# Patient Record
Sex: FEMALE | Employment: FULL TIME | URBAN - METROPOLITAN AREA
[De-identification: names, ages, dates, MRNs, and addresses within clinical notes are randomized per-mention and may not be internally consistent; named-entity substitution may affect disease eponyms.]

---

## 2025-02-05 ENCOUNTER — APPOINTMENT (OUTPATIENT)
Dept: DERMATOLOGY | Facility: CLINIC | Age: 35
End: 2025-02-05
Payer: COMMERCIAL

## 2025-02-05 DIAGNOSIS — Z79.899 HIGH RISK MEDICATION USE: ICD-10-CM

## 2025-02-05 DIAGNOSIS — D48.5 NEOPLASM OF UNCERTAIN BEHAVIOR OF SKIN: ICD-10-CM

## 2025-02-05 DIAGNOSIS — L30.9 DERMATITIS, UNSPECIFIED: Primary | ICD-10-CM

## 2025-02-05 PROCEDURE — 11900 INJECT SKIN LESIONS </W 7: CPT | Performed by: DERMATOLOGY

## 2025-02-05 PROCEDURE — 11104 PUNCH BX SKIN SINGLE LESION: CPT | Performed by: DERMATOLOGY

## 2025-02-05 PROCEDURE — 99204 OFFICE O/P NEW MOD 45 MIN: CPT | Performed by: DERMATOLOGY

## 2025-02-05 RX ORDER — TRIAMCINOLONE ACETONIDE 40 MG/ML
60 INJECTION, SUSPENSION INTRA-ARTICULAR; INTRAMUSCULAR ONCE
Status: COMPLETED | OUTPATIENT
Start: 2025-02-05 | End: 2025-02-05

## 2025-02-05 RX ORDER — MAG HYDROX/ALUMINUM HYD/SIMETH 200-200-20
SUSPENSION, ORAL (FINAL DOSE FORM) ORAL
COMMUNITY

## 2025-02-05 RX ORDER — HYDROCHLOROTHIAZIDE 12.5 MG/1
12.5 TABLET ORAL DAILY
COMMUNITY

## 2025-02-05 RX ORDER — AMLODIPINE BESYLATE 5 MG/1
5 TABLET ORAL
COMMUNITY
Start: 2024-11-22

## 2025-02-05 RX ORDER — TRIAMCINOLONE ACETONIDE 1 MG/G
CREAM TOPICAL
Qty: 454 G | Refills: 1 | Status: SHIPPED | OUTPATIENT
Start: 2025-02-05

## 2025-02-05 RX ADMIN — TRIAMCINOLONE ACETONIDE 60 MG: 40 INJECTION, SUSPENSION INTRA-ARTICULAR; INTRAMUSCULAR at 15:31

## 2025-02-05 NOTE — PROGRESS NOTES
Subjective     Matt Gaona is a 34 y.o. female who presents for the following: Rash (Generalized- since October. Pt states itchiness to sites. Pt states treatment with hydrocortisone cream, unknown prescribed pill, and OTC lotion with no response. Pt denies history of skin cancers. Pt declines chaperone. ).     Review of Systems:  No other skin or systemic complaints other than what is documented elsewhere in the note.    The following portions of the chart were reviewed this encounter and updated as appropriate:   Allergies  Meds  Problems  Med Hx  Surg Hx  Fam Hx                Objective   Well appearing patient in no apparent distress; mood and affect are within normal limits.    A focused skin examination was performed. All findings within normal limits unless otherwise noted below.    Assessment/Plan   1. Dermatitis, unspecified  Well demarcated erythematous, violaceous hyperpigmented plaques on majority of the body surface; only the face is spared                                                                          BSA >50% on exam today  -Discussed differential diagnosis; connective tissue disease v CTCL v PsO v atopic dermatitis  -Punch biopsy today  -Start triamcinolone cream twice daily  -IMK today to offer patient some relief hopefully while awaiting pathology  -Patient states no new medications before onset at the end of October. The rash first began on the arms, and has rapidly spread to involve the entire body except for the face. No one else at home with rash.   -Patient states has been on hydrochlorothiazide for years  -Re check in 2 weeks time      -Potential side effects of steroids reviewed, including but not limited to: elevation in blood pressure, elevation in blood sugar/worsening of diabetes, risk of cataracts/glaucoma, weight gain, bone loss, joint disease/aseptic necrosis, insomnia. There is a risk of atrophy at injection site.  -Discussed the need for GI prophylaxis if  taking a longer course of systemic corticosteroids.  After discussion, patient wishes to proceed with intramuscular Kenalog injection today   -Patient verbalizes understanding of these potential risks and verbal consent was obtained from the patient to treat with intramuscular Kenalog.  -Intramuscular kenalog  40mg/ml x 1.5 ml was injected into the R buttock after prepping the skin with alcohol. The patient tolerated the procedure well with no complications.   Medication Lot No. [id number]  Expiration 4/2026      triamcinolone (Kenalog) 0.1 % cream  Apply to affected areas of rash twice daily    Related Medications  triamcinolone acetonide (Kenalog-40) injection 60 mg      2. Neoplasm of uncertain behavior of skin  Left Lower Back  Erythematous, violaceous hyperpigmented plaques on majority of the body surface              Lesion biopsy  Type of biopsy: punch    Informed consent: discussed and consent obtained    Timeout: patient name, date of birth, surgical site, and procedure verified    Procedure prep:  Patient was prepped and draped  Anesthesia: the lesion was anesthetized in a standard fashion    Anesthetic:  1% lidocaine w/ epinephrine 1-100,000 local infiltration  Punch size:  4 mm  Suture size:  4-0  Suture type: Prolene (polypropylene)    Suture removal (days):  14  Hemostasis achieved with: suture    Outcome: patient tolerated procedure well    Post-procedure details: sterile dressing applied and wound care instructions given    Dressing type: bandage and petrolatum      Staff Communication: Dermatology Local Anesthesia: Site Location: L lower back 1 % Lidocaine / Epinephrine - Amount: 1cc    Specimen 1 - Dermatopathology- DERM LAB  Differential Diagnosis: r/o CTCL v cutaneous lupus v atopic dermatitis  Check Margins Yes/No?:    Comments:    Dermpath Lab: Routine Histopathology (formalin-fixed tissue)        Follow up in 2 weeks time with Dr. Iqbal for dermatitis    Discussed if there are any changes or  development of concerning symptoms (lesion/skin condition is changing, bleeding, enlarging, or worsening) the patient is to contact my office. The patient verbalizes understanding.    Theodora Jurado MD  2/5/2025      Addendum: Patients biopsy returned as:  SKIN, LEFT LOWER BACK, PUNCH BIOPSY:  SUBACUTE SPONGIOTIC DERMATITIS, SEE NOTE.  The differential diagnosis includes atopic dermatitis, nummular dermatitis, allergic contact dermatitis, id reaction, and eczematous drug reaction.   Recommend for the patient to get labs drawn in anticipation for starting dupixent given BSA affected. Labs ordered. Nurse to notify patient. Patient scheduled for follow up with me in 2 weeks time.    Theodora Jurado MD  02/08/25

## 2025-02-07 LAB
LABORATORY COMMENT REPORT: NORMAL
PATH REPORT.FINAL DX SPEC: NORMAL
PATH REPORT.GROSS SPEC: NORMAL
PATH REPORT.RELEVANT HX SPEC: NORMAL
PATH REPORT.TOTAL CANCER: NORMAL

## 2025-02-18 ENCOUNTER — APPOINTMENT (OUTPATIENT)
Dept: DERMATOLOGY | Facility: CLINIC | Age: 35
End: 2025-02-18
Payer: COMMERCIAL

## 2025-02-18 DIAGNOSIS — L98.9 DERMATOLOGIC PROBLEM: ICD-10-CM

## 2025-02-18 DIAGNOSIS — L20.89 OTHER ATOPIC DERMATITIS: Primary | ICD-10-CM

## 2025-02-18 PROCEDURE — 99214 OFFICE O/P EST MOD 30 MIN: CPT | Performed by: DERMATOLOGY

## 2025-02-18 NOTE — PROGRESS NOTES
Subjective     Matt Gaona is a 34 y.o. female who presents for the following: Dermatitis (Generalized. Follow up. Pt states treatment with triamcinolone cream with good response. Hx of treatment with IMK injection. ) and Suture / Staple Removal (Punch biopsy 02.05.25.).     Review of Systems:  No other skin or systemic complaints other than what is documented elsewhere in the note.    The following portions of the chart were reviewed this encounter and updated as appropriate:   Allergies  Meds  Problems  Med Hx  Surg Hx  Fam Hx                Objective   Well appearing patient in no apparent distress; mood and affect are within normal limits.    A focused skin examination was performed. All findings within normal limits unless otherwise noted below.    Assessment/Plan   1. Other atopic dermatitis  Well demarcated erythematous, violaceous hyperpigmented plaques on majority of the body surface; only the face is spared     Patient is improving with significant resolution of erythema and progression to PIH only on the trunk and arms; continues with active plaques on the lower legs  -Likely majority of response to IMK  -Continue triamcinolone topically to active plaques twice daily  -Reviewed biopsy results: SUBACUTE SPONGIOTIC DERMATITIS, SEE NOTE.   Note: Microscopic examination reveals a specimen that extends into the subcutaneous fat. There is parakeratosis with serum crust that is slightly mounded. The granular layer is diminished in areas and slightly thickened in other areas and there is mild irregular acanthosis of the epidermis with mild spongiosis and a mild to moderate superficial perivascular lymphocytic infiltrate with occasional eosinophils.   The differential diagnosis includes atopic dermatitis, nummular dermatitis, allergic contact dermatitis, id reaction, and eczematous drug reaction.   -Clinical exam is concerning for MF; biopsy is not consistent, will continue to monitor  -No history of  eczema as a child; rare for abrupt onset large BSA affected eczema to occur in a 33yo patient  -Patient denies any new products; does use different soaps and laundry detergents. Recommend changing to fragrance free and sensitive skin products only.  -Patient states no new medications; has been on hydrochlorothiazide and amlodipine only for years for HTN. Consider drug holiday if recalcitrant  -Discussed systemic therapy treatment options.  -Patient is open to begin NB UVB    -Recommend for the patient to start Narrow Band UVB Phototherapy.  -Treatment sessions are completed in the office 2-3 times per week   -It usually takes approximately 8 weeks or 30 sessions of phototherapy to begin to see improvement in the skin condition  -Reviewed risks of NB UVB Phototherapy, including but not limited to, darkening of the skin, potential for burning, potential for photoaging, potential for the development skin cancer.         Phototherapy treatment    2. Dermatologic problem    Related Procedures  Follow Up In Dermatology - Established Patient      -Sutures removed  -Wound dressed appropriately  -Further wound care instructions verbalized and the patient expresses understanding.        Follow up in 3 months with Dr Iqbal for dermatitis; phototherapy as scheduled  Discussed if there are any changes or development of concerning symptoms (lesion/skin condition is changing, bleeding, enlarging, or worsening) the patient is to contact my office. The patient verbalizes understanding.    Theodora Jurado MD  2/18/2025

## 2025-03-03 ENCOUNTER — APPOINTMENT (OUTPATIENT)
Dept: DERMATOLOGY | Facility: CLINIC | Age: 35
End: 2025-03-03
Payer: COMMERCIAL

## 2025-03-03 DIAGNOSIS — L20.89 OTHER ATOPIC DERMATITIS: ICD-10-CM

## 2025-03-03 PROCEDURE — 96900 ACTINOTHERAPY UV LIGHT: CPT | Performed by: DERMATOLOGY

## 2025-03-03 NOTE — PROGRESS NOTES
Phototherapy Procedure Note:    Subjective   Matt Gaona is a 34 y.o. female who presents for the following: Phototherapy    Phototherapy - Narrow Band UVB  Diagnosis - (also add to Visit Dx): Atopic Dermatitis, Other - L20.89  Phototherapy order has been reviewed?: Yes  Treatment Date: 25  Patient  Verified: Confirmed patient date of birth.  Provider: Rasheed Jurado  Location: [facility]  Treatment Number: 1  Schedule: Number of Treatment(s) per week : 2 times per week  Meter Reading in milliwatts: 3.75  UVB Dose Today mJ/Cm2 (millijoules): 400  UVB Time of Exposure Time in Minutes : Seconds: 1:47  Topical: None  Face and Eye Shielding: Goggles  Genital Shielding: Underwear  Treatment Comments: oriented to winn

## 2025-03-05 ENCOUNTER — APPOINTMENT (OUTPATIENT)
Dept: DERMATOLOGY | Facility: CLINIC | Age: 35
End: 2025-03-05
Payer: COMMERCIAL

## 2025-03-12 ENCOUNTER — APPOINTMENT (OUTPATIENT)
Dept: DERMATOLOGY | Facility: CLINIC | Age: 35
End: 2025-03-12
Payer: COMMERCIAL

## 2025-03-14 ENCOUNTER — APPOINTMENT (OUTPATIENT)
Dept: DERMATOLOGY | Facility: CLINIC | Age: 35
End: 2025-03-14
Payer: COMMERCIAL

## 2025-03-14 DIAGNOSIS — L20.89 OTHER ATOPIC DERMATITIS: ICD-10-CM

## 2025-03-14 PROCEDURE — 96900 ACTINOTHERAPY UV LIGHT: CPT | Performed by: STUDENT IN AN ORGANIZED HEALTH CARE EDUCATION/TRAINING PROGRAM

## 2025-03-14 NOTE — PROGRESS NOTES
Phototherapy Procedure Note:    Subjective   Matt Gaona is a 34 y.o. female who presents for the following: Phototherapy    Phototherapy - Narrow Band UVB  Diagnosis - (also add to Visit Dx): Atopic Dermatitis, Other - L20.89  Phototherapy order has been reviewed?: Yes  Treatment Date: 25  Patient  Verified: Confirmed patient date of birth.  Provider: Bright  Location: [facility]  Treatment Number: 2  Schedule: Number of Treatment(s) per week : 2 times per week  Meter Reading in milliwatts: 3.73  UVB Dose Today mJ/Cm2 (millijoules): 400  UVB Time of Exposure Time in Minutes : Seconds: 1:47  Topical: None  Face and Eye Shielding: Goggles  Genital Shielding: Underwear  Reaction-Redness/Erythema Grade: 0 - No erythema  NB UVB Reaction - Tenderness: No Tenderness  Treatment Comments: no c/o; held dose d/t missed tx (11 days)  NB UVB Dose Hold At mJ/Cm2: 400

## 2025-03-17 ENCOUNTER — APPOINTMENT (OUTPATIENT)
Dept: DERMATOLOGY | Facility: CLINIC | Age: 35
End: 2025-03-17
Payer: COMMERCIAL

## 2025-03-17 DIAGNOSIS — L20.89 OTHER ATOPIC DERMATITIS: ICD-10-CM

## 2025-03-17 PROCEDURE — 96900 ACTINOTHERAPY UV LIGHT: CPT | Performed by: DERMATOLOGY

## 2025-03-17 NOTE — PROGRESS NOTES
Phototherapy Procedure Note:    Michael Gaona is a 34 y.o. female who presents for the following: Phototherapy    Phototherapy - Narrow Band UVB  Diagnosis - (also add to Visit Dx): Atopic Dermatitis, Other - L20.89  Phototherapy order has been reviewed?: Yes  Treatment Date: 25  Patient  Verified: Confirmed patient date of birth.  Provider: Dr. Rasheed Jurado  [facility]: [facility]  Treatment Number: 3  Schedule: Number of Treatment(s) per week : 2 times per week  Meter Reading in milliwatts: 3.73  UVB Dose Today mJ/Cm2 (millijoules): 465  UVB Time of Exposure Time in Minutes : Seconds: 2:05  Topical: None  Face and Eye Shielding: Goggles  Genital Shielding: Underwear  Reaction-Redness/Erythema Grade: 0 - No erythema  NB UVB Reaction - Tenderness: No Tenderness  Treatment Comments: No complications after last treatment, no changes in meds, increase by 65 mj/cm2  NB UVB Dose Increased by mJ/Cm2: 65

## 2025-03-19 ENCOUNTER — APPOINTMENT (OUTPATIENT)
Dept: DERMATOLOGY | Facility: CLINIC | Age: 35
End: 2025-03-19
Payer: COMMERCIAL

## 2025-03-24 ENCOUNTER — APPOINTMENT (OUTPATIENT)
Dept: DERMATOLOGY | Facility: CLINIC | Age: 35
End: 2025-03-24
Payer: COMMERCIAL

## 2025-03-26 ENCOUNTER — APPOINTMENT (OUTPATIENT)
Dept: DERMATOLOGY | Facility: CLINIC | Age: 35
End: 2025-03-26
Payer: COMMERCIAL

## 2025-03-31 ENCOUNTER — APPOINTMENT (OUTPATIENT)
Dept: DERMATOLOGY | Facility: CLINIC | Age: 35
End: 2025-03-31
Payer: COMMERCIAL

## 2025-03-31 DIAGNOSIS — L20.89 OTHER ATOPIC DERMATITIS: ICD-10-CM

## 2025-03-31 PROCEDURE — 96900 ACTINOTHERAPY UV LIGHT: CPT | Performed by: DERMATOLOGY

## 2025-03-31 NOTE — PROGRESS NOTES
Phototherapy Procedure Note:    Michael Gaona is a 34 y.o. female who presents for the following: Phototherapy    Phototherapy - Narrow Band UVB  Diagnosis - (also add to Visit Dx): Atopic Dermatitis, Other - L20.89  Phototherapy order has been reviewed?: Yes  Treatment Date: 25  Patient  Verified: Confirmed patient date of birth.  Provider: Dr. Rasheed Jruado  [facility]: [facility]  Treatment Number: 4  Schedule: Number of Treatment(s) per week : 2 times per week  Meter Reading in milliwatts: 4.97  UVB Dose Today mJ/Cm2 (millijoules): 400  UVB Time of Exposure Time in Minutes : Seconds: 1:20  Topical: None  Face and Eye Shielding: Goggles  Genital Shielding: Underwear  Reaction-Redness/Erythema Grade: 0 - No erythema  NB UVB Reaction - Tenderness: No Tenderness  Treatment Comments: no c/o; decreased by 65mj/cm2 due to missed treatments(14 days) and winn mantenance.  NB UVB Dose Decreased by mJ/Cm2: 65

## 2025-04-02 ENCOUNTER — APPOINTMENT (OUTPATIENT)
Dept: DERMATOLOGY | Facility: CLINIC | Age: 35
End: 2025-04-02
Payer: COMMERCIAL

## 2025-04-07 ENCOUNTER — APPOINTMENT (OUTPATIENT)
Dept: DERMATOLOGY | Facility: CLINIC | Age: 35
End: 2025-04-07
Payer: COMMERCIAL

## 2025-04-09 ENCOUNTER — APPOINTMENT (OUTPATIENT)
Dept: DERMATOLOGY | Facility: CLINIC | Age: 35
End: 2025-04-09
Payer: COMMERCIAL

## 2025-04-09 DIAGNOSIS — L20.81 ATOPIC NEURODERMATITIS: ICD-10-CM

## 2025-04-09 PROCEDURE — 96900 ACTINOTHERAPY UV LIGHT: CPT | Performed by: DERMATOLOGY

## 2025-04-09 NOTE — PROGRESS NOTES
Phototherapy Procedure Note:    Michael Gaona is a 34 y.o. female who presents for the following: Phototherapy    Phototherapy - Narrow Band UVB  Diagnosis - (also add to Visit Dx): Atopic Dermatitis, Other - L20.89  Phototherapy order has been reviewed?: Yes  Treatment Date: 25  Patient  Verified: Confirmed patient date of birth.  Provider: Dr. Rasheed Jurado  [facility]  Treatment Number: 5  Schedule: Number of Treatment(s) per week : 2 times per week  Meter Reading in milliwatts: 4.84  UVB Dose Today mJ/Cm2 (millijoules): 400  UVB Time of Exposure Time in Minutes : Seconds: 1:23  Topical: None  Face and Eye Shielding: Goggles  Genital Shielding: Underwear  Reaction-Redness/Erythema Grade: 0 - No erythema  NB UVB Reaction - Tenderness: No Tenderness  Treatment Comments: Held at last treatment per protocol due to missed days, held at 440mj/cm2  NB UVB Dose Hold At mJ/Cm2: 400

## 2025-04-14 ENCOUNTER — APPOINTMENT (OUTPATIENT)
Dept: DERMATOLOGY | Facility: CLINIC | Age: 35
End: 2025-04-14
Payer: COMMERCIAL

## 2025-04-14 DIAGNOSIS — L20.89 OTHER ATOPIC DERMATITIS: ICD-10-CM

## 2025-04-14 PROCEDURE — 96900 ACTINOTHERAPY UV LIGHT: CPT | Performed by: DERMATOLOGY

## 2025-04-14 NOTE — PROGRESS NOTES
Phototherapy Procedure Note:    Michael Gaona is a 34 y.o. female who presents for the following: Phototherapy    Phototherapy - Narrow Band UVB  Diagnosis - (also add to Visit Dx): Atopic Dermatitis, Other - L20.89  Phototherapy order has been reviewed?: Yes  Treatment Date: 25  Patient  Verified: Confirmed patient date of birth.  Provider: Dr. Rasheed Jurado  [facility]: [facility]  Treatment Number: 6  Schedule: Number of Treatment(s) per week : 2 times per week  Meter Reading in milliwatts: 4.80  UVB Dose Today mJ/Cm2 (millijoules): 465  UVB Time of Exposure Time in Minutes : Seconds: 1:37  Topical: None  Face and Eye Shielding: Goggles  Genital Shielding: Underwear  Reaction-Redness/Erythema Grade: 0 - No erythema  NB UVB Reaction - Tenderness: No Tenderness  Treatment Comments: no c/o  NB UVB Dose Increased by mJ/Cm2: 65

## 2025-04-16 ENCOUNTER — APPOINTMENT (OUTPATIENT)
Dept: DERMATOLOGY | Facility: CLINIC | Age: 35
End: 2025-04-16
Payer: COMMERCIAL

## 2025-04-16 DIAGNOSIS — L20.89 OTHER ATOPIC DERMATITIS: ICD-10-CM

## 2025-04-16 PROCEDURE — 96900 ACTINOTHERAPY UV LIGHT: CPT | Performed by: DERMATOLOGY

## 2025-04-16 NOTE — PROGRESS NOTES
Phototherapy Procedure Note:    Michael Gaona is a 34 y.o. female who presents for the following: Phototherapy    Phototherapy - Narrow Band UVB  Diagnosis - (also add to Visit Dx): Atopic Dermatitis, Other - L20.89  Phototherapy order has been reviewed?: Yes  Treatment Date: 25  Patient  Verified: Confirmed patient date of birth.  Provider: Dr. Rasheed Jurado  [facility]: [facility]  Treatment Number: 7  Schedule: Number of Treatment(s) per week : 2 times per week  Meter Reading in milliwatts: 4.78  UVB Dose Today mJ/Cm2 (millijoules): 530  UVB Time of Exposure Time in Minutes : Seconds: 1:51  Topical: None  Face and Eye Shielding: Goggles  Genital Shielding: Underwear  Reaction-Redness/Erythema Grade: 0 - No erythema  NB UVB Reaction - Tenderness: No Tenderness  Treatment Comments: no c/o  NB UVB Dose Increased by mJ/Cm2: 65

## 2025-04-21 ENCOUNTER — APPOINTMENT (OUTPATIENT)
Dept: DERMATOLOGY | Facility: CLINIC | Age: 35
End: 2025-04-21
Payer: COMMERCIAL

## 2025-04-23 ENCOUNTER — APPOINTMENT (OUTPATIENT)
Dept: DERMATOLOGY | Facility: CLINIC | Age: 35
End: 2025-04-23
Payer: COMMERCIAL

## 2025-04-28 ENCOUNTER — APPOINTMENT (OUTPATIENT)
Dept: DERMATOLOGY | Facility: CLINIC | Age: 35
End: 2025-04-28
Payer: COMMERCIAL

## 2025-04-30 ENCOUNTER — APPOINTMENT (OUTPATIENT)
Dept: DERMATOLOGY | Facility: CLINIC | Age: 35
End: 2025-04-30
Payer: COMMERCIAL

## 2025-05-05 ENCOUNTER — APPOINTMENT (OUTPATIENT)
Dept: DERMATOLOGY | Facility: CLINIC | Age: 35
End: 2025-05-05
Payer: COMMERCIAL

## 2025-05-07 ENCOUNTER — APPOINTMENT (OUTPATIENT)
Dept: DERMATOLOGY | Facility: CLINIC | Age: 35
End: 2025-05-07
Payer: COMMERCIAL

## 2025-05-12 ENCOUNTER — APPOINTMENT (OUTPATIENT)
Dept: DERMATOLOGY | Facility: CLINIC | Age: 35
End: 2025-05-12
Payer: COMMERCIAL

## 2025-05-14 ENCOUNTER — APPOINTMENT (OUTPATIENT)
Dept: DERMATOLOGY | Facility: CLINIC | Age: 35
End: 2025-05-14
Payer: COMMERCIAL

## 2025-05-19 ENCOUNTER — APPOINTMENT (OUTPATIENT)
Dept: DERMATOLOGY | Facility: CLINIC | Age: 35
End: 2025-05-19
Payer: COMMERCIAL

## 2025-05-21 ENCOUNTER — APPOINTMENT (OUTPATIENT)
Dept: DERMATOLOGY | Facility: CLINIC | Age: 35
End: 2025-05-21
Payer: COMMERCIAL

## 2025-05-28 ENCOUNTER — APPOINTMENT (OUTPATIENT)
Dept: DERMATOLOGY | Facility: CLINIC | Age: 35
End: 2025-05-28
Payer: COMMERCIAL

## 2025-05-28 ENCOUNTER — TELEPHONE (OUTPATIENT)
Dept: DERMATOLOGY | Facility: CLINIC | Age: 35
End: 2025-05-28

## 2025-05-28 NOTE — TELEPHONE ENCOUNTER
Left message for pt to see if they are still interested in continueing with photo therapy session as we are getting ready to schedule for the month of June 2025. Thank you, Randi

## 2025-06-02 ENCOUNTER — APPOINTMENT (OUTPATIENT)
Dept: DERMATOLOGY | Facility: CLINIC | Age: 35
End: 2025-06-02
Payer: COMMERCIAL

## 2025-06-04 ENCOUNTER — APPOINTMENT (OUTPATIENT)
Dept: DERMATOLOGY | Facility: CLINIC | Age: 35
End: 2025-06-04
Payer: COMMERCIAL

## 2025-06-16 ENCOUNTER — APPOINTMENT (OUTPATIENT)
Dept: DERMATOLOGY | Facility: CLINIC | Age: 35
End: 2025-06-16
Payer: COMMERCIAL

## 2025-06-18 ENCOUNTER — APPOINTMENT (OUTPATIENT)
Dept: DERMATOLOGY | Facility: CLINIC | Age: 35
End: 2025-06-18
Payer: COMMERCIAL

## 2025-06-23 ENCOUNTER — APPOINTMENT (OUTPATIENT)
Dept: DERMATOLOGY | Facility: CLINIC | Age: 35
End: 2025-06-23
Payer: COMMERCIAL

## 2025-06-25 ENCOUNTER — APPOINTMENT (OUTPATIENT)
Dept: DERMATOLOGY | Facility: CLINIC | Age: 35
End: 2025-06-25
Payer: COMMERCIAL

## 2025-06-30 ENCOUNTER — APPOINTMENT (OUTPATIENT)
Dept: DERMATOLOGY | Facility: CLINIC | Age: 35
End: 2025-06-30
Payer: COMMERCIAL

## 2025-10-06 ENCOUNTER — APPOINTMENT (OUTPATIENT)
Dept: DERMATOLOGY | Facility: CLINIC | Age: 35
End: 2025-10-06
Payer: COMMERCIAL